# Patient Record
(demographics unavailable — no encounter records)

---

## 2021-02-05 NOTE — ULTRASOUND REPORT
ULTRASOUND OBSTETRIC 



INDICATION / CLINICAL INFORMATION: Heavy vaginal bleeding.

Clinical Gestational Age (GA) in weeks, days: Unknown 



TECHNIQUE: Transabdominal. Transvaginal  



COMPARISON: None available.



FINDINGS:

Uterus: The uterus measures 9.3 x 4.3 x 5.6 cm. No focal uterine mass identified. The endometrial com
plex is homogeneously mildly thickened measuring 15 mm in caliber.

No gestational sac identified within the uterus.





ADNEXA: Both ovaries are well-visualized and have a normal sonographic appearance. Color Doppler demo
nstrates normal ovarian blood flow bilaterally.

FREE FLUID: Trace free fluid is present in the cul-de-sac.



ADDITIONAL FINDINGS: None.



IMPRESSION:

1. No evidence of gestational sac within the uterus. Endometrial complex is mildly thickened. This is
 nonspecific and could indicate recent spontaneous  versus very early pregnancy. Correlation 
with hCG levels is recommended.

2. No adnexal mass. Both ovaries appear unremarkable.



Signer Name: Ophelia Foss MD 

Signed: 2021 9:01 PM

Workstation Name: [id number]

## 2021-02-05 NOTE — EMERGENCY DEPARTMENT REPORT
ED Female  HPI





- General


Chief complaint: Vaginal Bleeding


Stated complaint: 2MONTHS PREGNANT/BLEEDING


Time Seen by Provider: 21 15:44


Source: patient


Mode of arrival: Ambulatory


Limitations: No Limitations





- History of Present Illness


Initial comments: 





21-year-old G1, P0 with a last menstrual period of 11/15/20 at approximately 8 

weeks gestation presents the ED complaining of lower pelvic cramping with 

vaginal bleed that began today after she tripped while she was at [employer].  

Patient states she did not fall but tripped.  Patient states pain began after 

tripping and bleeding is about moderate.  Patient states she took a home 

pregnancy test around [place] that was positive and has not been to the OB yet


MD Complaint: vaginal bleeding, pelvic pain


Severity scale (0 -10): 5


Quality: cramping


Consistency: intermittent


Improves with: none


Worsens with: none


Are you Pregnant Now?: Yes


Associated Symptoms: denies: nausea/vomiting, fever/chills, hematuria, shortness

of breath, weakness





- Related Data


                                  Previous Rx's











 Medication  Instructions  Recorded  Last Taken  Type


 


Acetaminophen/Codeine [Tylenol 1 tab PO Q6H #12 tab 21 Unknown Rx





/Codeine # 3 tab]    











                                    Allergies











Allergy/AdvReac Type Severity Reaction Status Date / Time


 


No Known Allergies Allergy   Unverified 21 15:28














ED Review of Systems


ROS: 


Stated complaint: 2MONTHS PREGNANT/BLEEDING


Other details as noted in HPI





Comment: All other systems reviewed and negative





ED Past Medical Hx





- Past Medical History


Previous Medical History?: No





- Surgical History


Past Surgical History?: No





- Medications


Home Medications: 


                                Home Medications











 Medication  Instructions  Recorded  Confirmed  Last Taken  Type


 


Acetaminophen/Codeine [Tylenol 1 tab PO Q6H #12 tab 21  Unknown Rx





/Codeine # 3 tab]     














ED Physical Exam





- General


Limitations: No Limitations


General appearance: alert, in no apparent distress





- Head


Head exam: Present: atraumatic, normocephalic





- Eye


Eye exam: Present: normal appearance





- ENT


ENT exam: Present: mucous membranes moist





- Neck


Neck exam: Present: normal inspection





- Respiratory


Respiratory exam: Present: normal lung sounds bilaterally.  Absent: respiratory 

distress





- Cardiovascular


Cardiovascular Exam: Present: regular rate, normal rhythm.  Absent: systolic 

murmur, diastolic murmur, rubs, gallop





- GI/Abdominal


GI/Abdominal exam: Present: soft, normal bowel sounds.  Absent: distended, 

tenderness, guarding, rebound, mass





- Extremities Exam


Extremities exam: Present: normal inspection





- Back Exam


Back exam: Present: normal inspection





- Neurological Exam


Neurological exam: Present: alert, oriented X3





- Psychiatric


Psychiatric exam: Present: normal affect, normal mood





- Skin


Skin exam: Present: warm, dry, intact, normal color.  Absent: rash





ED Course


                                   Vital Signs











  21





  15:25 21:37


 


Temperature 98.6 F 98 F


 


Pulse Rate 84 78


 


Respiratory 16 16





Rate  


 


Blood Pressure 123/94 


 


Blood Pressure  116/78





[Left]  


 


O2 Sat by Pulse 99 100





Oximetry  














- Reevaluation(s)


Reevaluation #1: 


Patient states that pelvic pain is getting worse.  She is crying due to pain.


Tylenol with codeine ordered for patient.  Ultrasound still pending.


Patient in wheelchair in the [facility] waiting room


21 17:46








ED Medical Decision Making





- Lab Data


Result diagrams: 


                                 21 15:59





                                 21 15:59





- Radiology Data


Radiology results: report reviewed, image reviewed





 ULTRASOUND OBSTETRIC 





INDICATION / CLINICAL INFORMATION: Heavy vaginal bleeding. 


Clinical Gestational Age (GA) in weeks, days: Unknown 





TECHNIQUE: Transabdominal. Transvaginal 





COMPARISON: None available. 





FINDINGS: 


Uterus: The uterus measures 9.3 x 4.3 x 5.6 cm. No focal uterine mass 

identified. The endometrial 


 complex is homogeneously mildly thickened measuring 15 mm in caliber. 


No gestational sac identified within the uterus. 








ADNEXA: Both ovaries are well-visualized and have a normal sonographic 

appearance. Color Doppler 


 demonstrates normal ovarian blood flow bilaterally. 


FREE FLUID: Trace free fluid is present in the cul-de-sac. 





ADDITIONAL FINDINGS: None. 





IMPRESSION: 


1. No evidence of gestational sac within the uterus. Endometrial complex is 

mildly thickened. This 


 is nonspecific and could indicate recent spontaneous  versus very early

 pregnancy. 


 Correlation with hCG levels is recommended. 


2. No adnexal mass. Both ovaries appear unremarkable. 





Signer Name: Ophelia Foss MD 


Signed: 2021 9:01 PM 


Workstation Name: [id number] 








 Transcribed By:  


 Dictated By: Ophelia Foss MD 


 Electronically Authenticated By: Ophelia Foss MD 


 Signed Date/Time: 21 








- Medical Decision Making





21-year-old female presents to ED with spontaneous complete  miscarriage


ED course: Pt received ultra sound, CBC, urinalysis, pregnancy test and 

quantitative ED


All labs within normal limits,


 pregnancy quantitative slightly depleted for dates


Ultrasound shows no intrauterine pregnancy.  See reported above


Vital signs normalized patient is in no acute distress.


I discussed with the patient if follow-up with her OB/GYN.


I discussed all labs and ultrasound findings with the patient.  Patient 

understands instructions and will follow-up with OB within 3 days.


Discussed breast and take all medications as prescribed.  Bleeding precautions 

discussed with the patient and her  who was with the patient at the time 

of discharge and discussion.


I discussed with the patient that he if bleeding worsens or new symptoms develop

 to return to ED immediately


Patient feeling much better and pain resolved prior to discharge





Critical care attestation.: 


If time is entered above; I have spent that time in minutes in the direct care 

of this critically ill patient, excluding procedure time.








ED Disposition


Clinical Impression: 


 Spontaneous , Complete 





Disposition:  TO HOME OR SELFCARE


Is pt being admited?: No


Does the pt Need Aspirin: No


Condition: Stable


Instructions:  Miscarriage, Easy-to-Read, Miscarriage, Managing Pregnancy Loss


Additional Instructions: 


Make sure to follow up with the primary care physician as discussed.


Take all your medications as you've been prescribed.


If you have any worsening symptoms or develop new symptoms please return to ED 

immediately.


Prescriptions: 


Acetaminophen/Codeine [Tylenol /Codeine # 3 tab] 1 tab PO Q6H #12 tab


Referrals: 


[facility] [Primary Care Provider] - 3-5 Days


[facility] [Provider Group] - 3-5 Days


[facility] [Provider Group] - 3-5 Days


Forms:  Accompanied Note, Work/School Release Form(ED)


Time of Disposition: 21:11


Print Language: Bhutanese